# Patient Record
Sex: FEMALE | Race: WHITE
[De-identification: names, ages, dates, MRNs, and addresses within clinical notes are randomized per-mention and may not be internally consistent; named-entity substitution may affect disease eponyms.]

---

## 2019-11-18 NOTE — HP
HISTORY AND PHYSICAL:

 

DATE OF ADMISSION:  11/26/19

 

She will be coming into the main campus of [facility] on 11/26/19 
for right knee arthroscopic surgery.

 

CHIEF COMPLAINT:  Right knee pain posteriorly and medially.

 

HISTORY OF PRESENT ILLNESS:  She has been having trouble with this knee since 
June. There was not a particular injury, but it has become very bothersome and 
difficulty with pivoting, getting up and down stairs, and generally walking.  
She has been seen and checked by Dr. Duong, Dr. Mandujano, Dr. Serra, and a 
recent MRI scan was done showing medial meniscal tearing and some sprain of the 
ACL.  We believe the main problem is the meniscal tearing, and we have 
recommended the arthroscopic surgery.

 

PAST MEDICAL HISTORY:  General health has been good.  No heart attack.  No 
chest pain.  She is able to walk up 2 flights of stairs without chest pain, 
without shortness of breath.  She is a smoker and is working with [employer].  She 
does social drinking.  She has not had any cancers.  No history of DVT or 
pulmonary embolism.

 

PAST SURGICAL HISTORY:  Includes bilateral carpal tunnel syndromes, reduction 
mammoplasty, exploratory laparotomy for OB/GYN concerns, irritable bowel 
syndrome, several D and C's, low back surgery in 2011 including spinal 
instrumentation, and gallbladder removal in 2012.  She has not had major 
complications with anesthesia.

 

ALLERGIES:  She is allergic only to ADHESIVE and she knows she is okay to have 
Ancef.

 

FAMILY HISTORY:  Positive for cancer, cardiac and diabetes.

 

SOCIAL HISTORY:  Lives with her , works at the hospital.  At home, there 
are 5 stairs to get in and out of the house.  She knows that she is okay to 
have Ancef.

 

                               PHYSICAL EXAMINATION

 

GENERAL:  Well nourished, well developed, not acutely distressed.

 

VITAL SIGNS:  Temp 99, weight 170, pulse 97, blood pressure 138/88.

 

HEENT:  The head is NC/AT.

 

LUNGS:  Clear bilaterally.

 

HEART:  Regular.  S1, S2 normal.  No murmurs or gallops.

 

ABDOMEN:  Soft, nontender.  I do not appreciate organomegaly.

 

EXTREMITIES:  The right knee shows a small effusion.  Extension 0, flexion 120. 
Tenderness to the medial joint line, marked medial joint discomfort with 
external rotation of the leg.  MCL stable.  Lachman and posterior drawer are 
normal. Lateral collateral stable.  The thigh and calf are soft.

 

NEUROLOGIC:  Cranial nerves are grossly intact.

 

 IMPRESSION:  Right knee medial meniscal tearing.

 

PLAN:  We have recommended arthroscopic surgery for the right knee.  Goals, 
risks, and complications have been reviewed.  Questions were answered and 
guarantees were not made regarding the outcome of arthroscopic surgery right 
knee on 11/26/19.

 



[id number]/[facility] #: [id number]

MTDD

## 2019-11-26 ENCOUNTER — HOSPITAL ENCOUNTER (OUTPATIENT)
Dept: HOSPITAL 25 - OR | Age: 49
Discharge: HOME | End: 2019-11-26
Attending: ORTHOPAEDIC SURGERY
Payer: COMMERCIAL

## 2019-11-26 VITALS — DIASTOLIC BLOOD PRESSURE: 76 MMHG | SYSTOLIC BLOOD PRESSURE: 115 MMHG
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
## 2019-11-26 DIAGNOSIS — S83.241D: Primary | ICD-10-CM

## 2019-11-26 DIAGNOSIS — F17.200: ICD-10-CM
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
## 2019-11-26 PROCEDURE — 81025 URINE PREGNANCY TEST: CPT

## 2019-11-26 NOTE — OP
DATE OF OPERATION:  11/26/19 - SDS

 

DATE OF BIRTH:  [birth year]

 

SURGICAL CARE:  Right knee.

 

SURGEON:  Shyam Lo MD.

 

ASSISTANTS:  ABDIAS Cabello.

 

ANESTHESIOLOGIST:  Dr. Mustapha Banks.

 

ANESTHESIA:  LMA, general.

 

PRE-OP DIAGNOSIS:  Right knee medial meniscal tear.

 

POST-OP DIAGNOSIS:  Right knee medial meniscal tear.

 

OPERATIVE PROCEDURE:  Right knee subtotal medial meniscectomy.

 

COMPLICATIONS:  There were no complications.

 

DRAINS:  There were no drains.

 

TOURNIQUET:  Tourniquet control was utilized on the right leg 275 and then 300.

 

ESTIMATED BLOOD LOSS:  100 mL.

 

REPLACEMENT:  Crystalloid fluids.

 

INDICATION:  Persistent medial right knee pain.

 

DESCRIPTION OF PROCEDURE:  The patient was brought to the operating room and 
placed on the operating room table in supine position.  Following the 
administration of the anesthetic, the right lower extremity was wrapped with a 
proximal thigh tourniquet.  The leg was given a preliminary chlorhexidine prep 
and then a formal ChloraPrep from the tourniquet to the foot.  After prepping, 
draping, and sealing off, we did our universal protocol time-out confirming 
Mary Motley and a plan for right knee arthroscopic surgery.  We all 
agreed and we proceeded.  The tourniquet was elevated to 275.  The initial 
survey of the joint showed that the patellofemoral joint was in good condition.
  The medial and lateral gutters were clear.  The ACL was in continuity (MRI 
scan had concern about ACL injury).  The medial femoral condyle, medial tibial 
plateau were in good condition.  The medial meniscus was torn up in complex 
fashion posteriorly.  In the initial part of the case, we were visualizing and 
then after some anterior synovectomy to remove the ligamentum mucosa, we had 
some bleeding and the inflow catheter was repositioned as it was outside the 
joint and then the joint was irrigated.  We were able to proceed with partial 
medial meniscectomy.  The meniscectomy was completed with a curved shaver, 
small and larger baskets and left curving basket.  Care was taken not to injure 
the adjacent medial femoral condyle, medial tibial plateau during the 
meniscectomy and the meniscectomy was done with the knee in valgus stress 
between 0 and 30 degrees of flexion with some external rotation of the foot.  
The meniscus was removed from the lateral portal at the anterior transition.  
Once the surgical care was completed, a photograph was obtained.  The knee was 
then irrigated with another 5 L of saline irrigation, then emptied, then 
instilled with Marcaine with epinephrine 30 mL.  The skin portals were closed 
with interrupted 3-0 Surgipro and a dressing applied after washing and drying 
of Betadine soaked release.  Sterile gauze, Webril, cryotherapy cuff, ABD pads 
and then a 6-inch Ace bandage loosely applied.  The patient was returned to the 
recovery room in stable and satisfactory condition, having tolerated the 
procedure very well.

 

 [id number]/[facility] #: [id number]

[facility]